# Patient Record
Sex: MALE | Race: WHITE | Employment: UNEMPLOYED | URBAN - METROPOLITAN AREA
[De-identification: names, ages, dates, MRNs, and addresses within clinical notes are randomized per-mention and may not be internally consistent; named-entity substitution may affect disease eponyms.]

---

## 2018-11-25 ENCOUNTER — HOSPITAL ENCOUNTER (EMERGENCY)
Age: 28
Discharge: ANOTHER HOSPITAL | End: 2018-11-25
Attending: EMERGENCY MEDICINE
Payer: MEDICAID

## 2018-11-25 ENCOUNTER — APPOINTMENT (OUTPATIENT)
Dept: CT IMAGING | Age: 28
End: 2018-11-25
Payer: MEDICAID

## 2018-11-25 ENCOUNTER — HOSPITAL ENCOUNTER (INPATIENT)
Age: 28
LOS: 1 days | Discharge: HOME | DRG: 918 | End: 2018-11-26
Attending: INTERNAL MEDICINE | Admitting: INTERNAL MEDICINE
Payer: MEDICAID

## 2018-11-25 VITALS
DIASTOLIC BLOOD PRESSURE: 73 MMHG | TEMPERATURE: 98.6 F | SYSTOLIC BLOOD PRESSURE: 118 MMHG | OXYGEN SATURATION: 100 % | WEIGHT: 185 LBS | RESPIRATION RATE: 23 BRPM | HEART RATE: 42 BPM

## 2018-11-25 DIAGNOSIS — R56.9 SEIZURE (HCC): Primary | ICD-10-CM

## 2018-11-25 DIAGNOSIS — R00.1 BRADYCARDIA WITH 41-50 BEATS PER MINUTE: ICD-10-CM

## 2018-11-25 DIAGNOSIS — R00.1 SYMPTOMATIC BRADYCARDIA: ICD-10-CM

## 2018-11-25 DIAGNOSIS — R55 SYNCOPE AND COLLAPSE: Primary | ICD-10-CM

## 2018-11-25 LAB
A/G RATIO: 1.8 (ref 1.1–2.2)
ALBUMIN SERPL-MCNC: 4.9 G/DL (ref 3.4–5)
ALP BLD-CCNC: 62 U/L (ref 40–129)
ALT SERPL-CCNC: 10 U/L (ref 10–40)
AMPHETAMINE SCREEN, URINE: ABNORMAL
ANION GAP SERPL CALCULATED.3IONS-SCNC: 13 MMOL/L (ref 3–16)
AST SERPL-CCNC: 18 U/L (ref 15–37)
BACTERIA: ABNORMAL /HPF
BARBITURATE SCREEN URINE: ABNORMAL
BASOPHILS ABSOLUTE: 0.1 K/UL (ref 0–0.2)
BASOPHILS RELATIVE PERCENT: 1 %
BENZODIAZEPINE SCREEN, URINE: ABNORMAL
BILIRUB SERPL-MCNC: 0.6 MG/DL (ref 0–1)
BILIRUBIN URINE: NEGATIVE
BLOOD, URINE: ABNORMAL
BUN BLDV-MCNC: 12 MG/DL (ref 7–20)
CALCIUM SERPL-MCNC: 9.8 MG/DL (ref 8.3–10.6)
CANNABINOID SCREEN URINE: POSITIVE
CHLORIDE BLD-SCNC: 102 MMOL/L (ref 99–110)
CLARITY: CLEAR
CO2: 23 MMOL/L (ref 21–32)
COCAINE METABOLITE SCREEN URINE: ABNORMAL
COLOR: YELLOW
CREAT SERPL-MCNC: 0.9 MG/DL (ref 0.9–1.3)
EKG ATRIAL RATE: 41 BPM
EKG DIAGNOSIS: NORMAL
EKG P AXIS: 33 DEGREES
EKG P-R INTERVAL: 182 MS
EKG Q-T INTERVAL: 482 MS
EKG QRS DURATION: 110 MS
EKG QTC CALCULATION (BAZETT): 397 MS
EKG R AXIS: 75 DEGREES
EKG T AXIS: 65 DEGREES
EKG VENTRICULAR RATE: 41 BPM
EOSINOPHILS ABSOLUTE: 0.1 K/UL (ref 0–0.6)
EOSINOPHILS RELATIVE PERCENT: 1.2 %
EPITHELIAL CELLS, UA: ABNORMAL /HPF
GFR AFRICAN AMERICAN: >60
GFR NON-AFRICAN AMERICAN: >60
GLOBULIN: 2.7 G/DL
GLUCOSE BLD-MCNC: 89 MG/DL (ref 70–99)
GLUCOSE URINE: NEGATIVE MG/DL
HCT VFR BLD CALC: 46.4 % (ref 40.5–52.5)
HEMOGLOBIN: 16.4 G/DL (ref 13.5–17.5)
KETONES, URINE: NEGATIVE MG/DL
LEUKOCYTE ESTERASE, URINE: NEGATIVE
LYMPHOCYTES ABSOLUTE: 1.6 K/UL (ref 1–5.1)
LYMPHOCYTES RELATIVE PERCENT: 19.1 %
Lab: ABNORMAL
MCH RBC QN AUTO: 31.2 PG (ref 26–34)
MCHC RBC AUTO-ENTMCNC: 35.3 G/DL (ref 31–36)
MCV RBC AUTO: 88.4 FL (ref 80–100)
METHADONE SCREEN, URINE: ABNORMAL
MICROSCOPIC EXAMINATION: YES
MONOCYTES ABSOLUTE: 0.7 K/UL (ref 0–1.3)
MONOCYTES RELATIVE PERCENT: 7.9 %
MUCUS: ABNORMAL /LPF
NEUTROPHILS ABSOLUTE: 6 K/UL (ref 1.7–7.7)
NEUTROPHILS RELATIVE PERCENT: 70.8 %
NITRITE, URINE: NEGATIVE
OPIATE SCREEN URINE: ABNORMAL
OXYCODONE URINE: ABNORMAL
PDW BLD-RTO: 13.3 % (ref 12.4–15.4)
PH UA: 6.5
PH UA: 6.5
PHENCYCLIDINE SCREEN URINE: ABNORMAL
PLATELET # BLD: 308 K/UL (ref 135–450)
PMV BLD AUTO: 8.3 FL (ref 5–10.5)
POTASSIUM REFLEX MAGNESIUM: 3.8 MMOL/L (ref 3.5–5.1)
PROPOXYPHENE SCREEN: ABNORMAL
PROTEIN UA: NEGATIVE MG/DL
RBC # BLD: 5.25 M/UL (ref 4.2–5.9)
RBC UA: ABNORMAL /HPF (ref 0–2)
SODIUM BLD-SCNC: 138 MMOL/L (ref 136–145)
SPECIFIC GRAVITY UA: 1.02
TOTAL PROTEIN: 7.6 G/DL (ref 6.4–8.2)
TROPONIN: <0.01 NG/ML
URINE TYPE: ABNORMAL
UROBILINOGEN, URINE: 0.2 E.U./DL
WBC # BLD: 8.5 K/UL (ref 4–11)
WBC UA: ABNORMAL /HPF (ref 0–5)

## 2018-11-25 PROCEDURE — 99285 EMERGENCY DEPT VISIT HI MDM: CPT

## 2018-11-25 PROCEDURE — 82746 ASSAY OF FOLIC ACID SERUM: CPT

## 2018-11-25 PROCEDURE — 93010 ELECTROCARDIOGRAM REPORT: CPT | Performed by: INTERNAL MEDICINE

## 2018-11-25 PROCEDURE — 2580000003 HC RX 258: Performed by: EMERGENCY MEDICINE

## 2018-11-25 PROCEDURE — G0379 DIRECT REFER HOSPITAL OBSERV: HCPCS

## 2018-11-25 PROCEDURE — 2580000003 HC RX 258: Performed by: INTERNAL MEDICINE

## 2018-11-25 PROCEDURE — 84484 ASSAY OF TROPONIN QUANT: CPT

## 2018-11-25 PROCEDURE — G0378 HOSPITAL OBSERVATION PER HR: HCPCS

## 2018-11-25 PROCEDURE — 36415 COLL VENOUS BLD VENIPUNCTURE: CPT

## 2018-11-25 PROCEDURE — 96372 THER/PROPH/DIAG INJ SC/IM: CPT

## 2018-11-25 PROCEDURE — 93005 ELECTROCARDIOGRAM TRACING: CPT | Performed by: EMERGENCY MEDICINE

## 2018-11-25 PROCEDURE — 80307 DRUG TEST PRSMV CHEM ANLYZR: CPT

## 2018-11-25 PROCEDURE — 85025 COMPLETE CBC W/AUTO DIFF WBC: CPT

## 2018-11-25 PROCEDURE — 81001 URINALYSIS AUTO W/SCOPE: CPT

## 2018-11-25 PROCEDURE — 87086 URINE CULTURE/COLONY COUNT: CPT

## 2018-11-25 PROCEDURE — 82607 VITAMIN B-12: CPT

## 2018-11-25 PROCEDURE — 2060000000 HC ICU INTERMEDIATE R&B

## 2018-11-25 PROCEDURE — 70450 CT HEAD/BRAIN W/O DYE: CPT

## 2018-11-25 PROCEDURE — 80053 COMPREHEN METABOLIC PANEL: CPT

## 2018-11-25 PROCEDURE — 84443 ASSAY THYROID STIM HORMONE: CPT

## 2018-11-25 PROCEDURE — 96360 HYDRATION IV INFUSION INIT: CPT

## 2018-11-25 PROCEDURE — 6360000002 HC RX W HCPCS: Performed by: INTERNAL MEDICINE

## 2018-11-25 RX ORDER — ONDANSETRON 2 MG/ML
4 INJECTION INTRAMUSCULAR; INTRAVENOUS EVERY 6 HOURS PRN
Status: DISCONTINUED | OUTPATIENT
Start: 2018-11-25 | End: 2018-11-26 | Stop reason: HOSPADM

## 2018-11-25 RX ORDER — 0.9 % SODIUM CHLORIDE 0.9 %
1000 INTRAVENOUS SOLUTION INTRAVENOUS ONCE
Status: COMPLETED | OUTPATIENT
Start: 2018-11-25 | End: 2018-11-25

## 2018-11-25 RX ORDER — SODIUM CHLORIDE 9 MG/ML
INJECTION, SOLUTION INTRAVENOUS CONTINUOUS
Status: DISCONTINUED | OUTPATIENT
Start: 2018-11-25 | End: 2018-11-26 | Stop reason: HOSPADM

## 2018-11-25 RX ORDER — SODIUM CHLORIDE 0.9 % (FLUSH) 0.9 %
10 SYRINGE (ML) INJECTION EVERY 12 HOURS SCHEDULED
Status: DISCONTINUED | OUTPATIENT
Start: 2018-11-25 | End: 2018-11-26 | Stop reason: HOSPADM

## 2018-11-25 RX ORDER — SODIUM CHLORIDE 0.9 % (FLUSH) 0.9 %
10 SYRINGE (ML) INJECTION PRN
Status: DISCONTINUED | OUTPATIENT
Start: 2018-11-25 | End: 2018-11-26 | Stop reason: HOSPADM

## 2018-11-25 RX ADMIN — SODIUM CHLORIDE: 9 INJECTION, SOLUTION INTRAVENOUS at 16:12

## 2018-11-25 RX ADMIN — SODIUM CHLORIDE 1000 ML: 9 INJECTION, SOLUTION INTRAVENOUS at 10:26

## 2018-11-25 RX ADMIN — ENOXAPARIN SODIUM 40 MG: 40 INJECTION SUBCUTANEOUS at 16:12

## 2018-11-25 ASSESSMENT — PAIN SCALES - GENERAL
PAINLEVEL_OUTOF10: 0
PAINLEVEL_OUTOF10: 0

## 2018-11-25 NOTE — ED PROVIDER NOTES
Emergency Physician Note    Chief Complaint  Seizures (has been having seizures since the beginning of the year. has not seen a doctor. here today because he had one last night and the night before and has never had then back to back before.)       History of Present Illness  Vinod Garcia is a 33 y.o. male who presents to the ED for seizures and syncope. Patient is at the beginning of the sister having episodes where she he will feel very lightheaded, break out in sweats and then either faint or have a seizure. The first episode happened at the beginning of the year it was witnessed by his wife, she states that his right arm was tremorous his eyes rolled back and he was not responding to her, she states it lasted approximately 15 seconds. Initially the patient was having 1-2 episodes per week and then for a couple of months it seemed to have completely resolved and then it returned again and now it appears to be more frequent. He believes he's been having them at night because he wakes up really pale with diffuse myalgias. He's had 2 over the last 2 nights and therefore he decided to come to the ER for further evaluation. Patient states that he will frequently have episodes in which he will become lightheaded, pale, and very dizzy and diaphoretic but doesn't always lead to syncope/seizures. Denies any recent head trauma. Denies fever, chills, malaise, chest pain, shortness of breath, cough, abdominal pain, nausea, vomiting, diarrhea, headache, sore throat, dysuria, hematuria, back pain, rash. No palliative/provocative factors. Positives and pertinent negatives as per HPI. All other of the 10 systems were reviewed and are negative. I have reviewed the following from the nursing documentation:      Prior to Admission medications    Not on File       Allergies as of 11/25/2018    (Not on File)       No past medical history on file. Surgical History: No past surgical history on file. transcription errors may be present despite my best efforts to edit errors.     MD Yu Aguillon MD  11/25/18 1398

## 2018-11-25 NOTE — ED NOTES
1325 - room number 446-1  Report # [id number]   Had Russell Chacon talk to [facility] about transport needs. Quang Peterson  11/25/18 1321 [id number] - [facility] called with transport eta 1600 for first care.       Quang Peterson  11/25/18 0713

## 2018-11-25 NOTE — ED NOTES
Chief Complaint   Patient presents with    Seizures     has been having seizures since the beginning of the year. has not seen a doctor. here today because he had one last night and the night before and has never had then back to back before. Pt placed on cardiac monitor, patient appears to be sinus puneet to NSR. Call light within reach. Will continue to monitor.       Molly Esteban RN  11/25/18 9669

## 2018-11-26 ENCOUNTER — APPOINTMENT (OUTPATIENT)
Dept: MRI IMAGING | Age: 28
DRG: 918 | End: 2018-11-26
Attending: INTERNAL MEDICINE
Payer: MEDICAID

## 2018-11-26 VITALS
RESPIRATION RATE: 16 BRPM | HEIGHT: 73 IN | OXYGEN SATURATION: 98 % | BODY MASS INDEX: 23.92 KG/M2 | WEIGHT: 180.5 LBS | SYSTOLIC BLOOD PRESSURE: 109 MMHG | DIASTOLIC BLOOD PRESSURE: 72 MMHG | TEMPERATURE: 97.7 F | HEART RATE: 45 BPM

## 2018-11-26 DIAGNOSIS — R55 SYNCOPE AND COLLAPSE: Primary | ICD-10-CM

## 2018-11-26 LAB
FOLATE: 17.62 NG/ML (ref 4.78–24.2)
LV EF: 58 %
LVEF MODALITY: NORMAL
TSH REFLEX: 1.31 UIU/ML (ref 0.27–4.2)
URINE CULTURE, ROUTINE: NORMAL
VITAMIN B-12: 650 PG/ML (ref 211–911)

## 2018-11-26 PROCEDURE — 96372 THER/PROPH/DIAG INJ SC/IM: CPT

## 2018-11-26 PROCEDURE — 95819 EEG AWAKE AND ASLEEP: CPT

## 2018-11-26 PROCEDURE — 70553 MRI BRAIN STEM W/O & W/DYE: CPT

## 2018-11-26 PROCEDURE — 99254 IP/OBS CNSLTJ NEW/EST MOD 60: CPT | Performed by: PSYCHIATRY & NEUROLOGY

## 2018-11-26 PROCEDURE — G0378 HOSPITAL OBSERVATION PER HR: HCPCS

## 2018-11-26 PROCEDURE — 2580000003 HC RX 258: Performed by: INTERNAL MEDICINE

## 2018-11-26 PROCEDURE — 6360000004 HC RX CONTRAST MEDICATION: Performed by: PSYCHIATRY & NEUROLOGY

## 2018-11-26 PROCEDURE — 95816 EEG AWAKE AND DROWSY: CPT | Performed by: PSYCHIATRY & NEUROLOGY

## 2018-11-26 PROCEDURE — A9579 GAD-BASE MR CONTRAST NOS,1ML: HCPCS | Performed by: PSYCHIATRY & NEUROLOGY

## 2018-11-26 PROCEDURE — 93306 TTE W/DOPPLER COMPLETE: CPT | Performed by: INTERNAL MEDICINE

## 2018-11-26 PROCEDURE — 6360000002 HC RX W HCPCS: Performed by: INTERNAL MEDICINE

## 2018-11-26 RX ORDER — MORPHINE SULFATE 2 MG/ML
2 INJECTION, SOLUTION INTRAMUSCULAR; INTRAVENOUS ONCE
Status: DISCONTINUED | OUTPATIENT
Start: 2018-11-26 | End: 2018-11-26

## 2018-11-26 RX ADMIN — Medication 10 ML: at 09:01

## 2018-11-26 RX ADMIN — ENOXAPARIN SODIUM 40 MG: 40 INJECTION SUBCUTANEOUS at 09:05

## 2018-11-26 RX ADMIN — GADOTERIDOL 15 ML: 279.3 INJECTION, SOLUTION INTRAVENOUS at 16:30

## 2018-11-26 RX ADMIN — SODIUM CHLORIDE: 9 INJECTION, SOLUTION INTRAVENOUS at 01:20

## 2018-11-26 ASSESSMENT — PAIN SCALES - GENERAL: PAINLEVEL_OUTOF10: 0

## 2018-11-26 NOTE — CONSULTS
attempts are made to edit the dictation for accuracy, there may be errors in the  transcription that are not intended

## 2018-11-26 NOTE — H&P
Hospital Medicine History & Physical      PCP: No primary care provider on file. Date of Admission: 11/25/2018    Date of Service: Pt seen/examined on 11/25/18 and  Placed in Observation. Chief Complaint:  Possible seizure      History Of Present Illness:  28 y.o. male who presented to [facility] with a direct transfer from [facility].  Patient states that he awoke this morning patient has awoken lightheaded and pale and dizzy and diaphoretic. He felt or seizures coming on but did not actually have any witnessed seizures. Patient has had previous episode several months ago where he did have jerking motions which were described as seizures but he did not seek any medical attention at that time. he was noted to be very bradycardic in the emergency room and was transferred here for cardiac and neurological workup  He should states he's had been on otherwise good health. He denies illicit drug use other than smoking marijuana which he states he hasn't done in several weeks     Past Medical History:      No past medical history on file. Past Surgical History:      No past surgical history on file. Medications Prior to Admission:      Prior to Admission medications    Not on File       Allergies:  Patient has no known allergies. Social History:      The patient currently lives Family    TOBACCO:   reports that he has been smoking. He has a 7.50 pack-year smoking history. He has never used smokeless tobacco.  ETOH:   reports that he drinks alcohol. Family History:       Reviewed in detail and negative for DM, CAD, Cancer, CVA. Positive as follows:    No family history on file. REVIEW OF SYSTEMS:   Pertinent positives as noted in the HPI. All other systems reviewed and negative.     PHYSICAL EXAM PERFORMED:    /69   Pulse 52   Temp 98.3 °F (36.8 °C) (Oral)   Resp 14   Ht 6' 1\" (1.854 m)   Wt 177 lb 4.8 oz (80.4 kg)   SpO2 97%   BMI 23.39 kg/m²     General

## 2018-11-26 NOTE — PLAN OF CARE
Problem: Health Behavior:  Goal: Ability to manage health-related needs will improve  Ability to manage health-related needs will improve   Outcome: Ongoing  Will continue to monitor.

## 2018-11-26 NOTE — CARE COORDINATION
11/26/18 Chart reviewed, asked financial counseling to see this pt.  Please use OP pharmacy on d/c and notify CM should pt have any additional needs

## 2018-11-30 NOTE — DISCHARGE SUMMARY
cardiology  MRI was negative  Check TSH folate and B12 were wnl    Physical Exam Performed:     /72   Pulse (!) 45   Temp 97.7 °F (36.5 °C) (Oral)   Resp 16   Ht 6' 1\" (1.854 m)   Wt 180 lb 8 oz (81.9 kg)   SpO2 98%   BMI 23.81 kg/m²       General appearance:  No apparent distress, appears stated age and cooperative. HEENT:  Normal cephalic, atraumatic without obvious deformity. Pupils equal, round, and reactive to light. Extra ocular muscles intact. Conjunctivae/corneas clear. Neck: Supple, with full range of motion. No jugular venous distention. Trachea midline. Respiratory:  Normal respiratory effort. Clear to auscultation, bilaterally without Rales/Wheezes/Rhonchi. Cardiovascular:  Regular rate and rhythm with normal S1/S2 without murmurs, rubs or gallops. Abdomen: Soft, non-tender, non-distended with normal bowel sounds. Musculoskeletal:  No clubbing, cyanosis or edema bilaterally. Full range of motion without deformity. Skin: Skin color, texture, turgor normal.  No rashes or lesions. Neurologic:  Neurovascularly intact without any focal sensory/motor deficits. Cranial nerves: II-XII intact, grossly non-focal.  Psychiatric:  Alert and oriented, thought content appropriate, normal insight  Capillary Refill: Brisk,< 3 seconds   Peripheral Pulses: +2 palpable, equal bilaterally       Labs: For convenience and continuity at follow-up the following most recent labs are provided:      CBC:    Lab Results   Component Value Date    WBC 8.5 11/25/2018    HGB 16.4 11/25/2018    HCT 46.4 11/25/2018     11/25/2018       Renal:    Lab Results   Component Value Date     11/25/2018    K 3.8 11/25/2018     11/25/2018    CO2 23 11/25/2018    BUN 12 11/25/2018    CREATININE 0.9 11/25/2018    CALCIUM 9.8 11/25/2018         Significant Diagnostic Studies    Radiology:   MRI BRAIN W WO CONTRAST   Final Result   No acute intracranial abnormality.   No mass effect or abnormal intracranial

## 2019-01-15 ENCOUNTER — TELEPHONE (OUTPATIENT)
Dept: CARDIOLOGY CLINIC | Age: 29
End: 2019-01-15

## 2019-01-15 DIAGNOSIS — R55 SYNCOPE AND COLLAPSE: ICD-10-CM

## 2019-01-15 PROCEDURE — 93228 REMOTE 30 DAY ECG REV/REPORT: CPT | Performed by: INTERNAL MEDICINE

## 2019-11-30 ENCOUNTER — HOSPITAL ENCOUNTER (EMERGENCY)
Age: 29
Discharge: HOME | End: 2019-11-30
Attending: EMERGENCY MEDICINE
Payer: MEDICAID

## 2019-11-30 ENCOUNTER — APPOINTMENT (OUTPATIENT)
Dept: GENERAL RADIOLOGY | Age: 29
End: 2019-11-30
Payer: MEDICAID

## 2019-11-30 VITALS
HEART RATE: 60 BPM | WEIGHT: 180 LBS | OXYGEN SATURATION: 98 % | TEMPERATURE: 98 F | HEIGHT: 73 IN | RESPIRATION RATE: 16 BRPM | SYSTOLIC BLOOD PRESSURE: 119 MMHG | DIASTOLIC BLOOD PRESSURE: 75 MMHG | BODY MASS INDEX: 23.86 KG/M2

## 2019-11-30 DIAGNOSIS — R55 SYNCOPE AND COLLAPSE: ICD-10-CM

## 2019-11-30 DIAGNOSIS — R07.9 CHEST PAIN, UNSPECIFIED TYPE: Primary | ICD-10-CM

## 2019-11-30 LAB
A/G RATIO: 1.8 (ref 1.1–2.2)
ALBUMIN SERPL-MCNC: 4.4 G/DL (ref 3.4–5)
ALP BLD-CCNC: 66 U/L (ref 40–129)
ALT SERPL-CCNC: 20 U/L (ref 10–40)
ANION GAP SERPL CALCULATED.3IONS-SCNC: 11 MMOL/L (ref 3–16)
AST SERPL-CCNC: 21 U/L (ref 15–37)
BASOPHILS ABSOLUTE: 0.1 K/UL (ref 0–0.2)
BASOPHILS RELATIVE PERCENT: 0.9 %
BILIRUB SERPL-MCNC: 0.3 MG/DL (ref 0–1)
BUN BLDV-MCNC: 12 MG/DL (ref 7–20)
CALCIUM SERPL-MCNC: 9.3 MG/DL (ref 8.3–10.6)
CHLORIDE BLD-SCNC: 104 MMOL/L (ref 99–110)
CO2: 23 MMOL/L (ref 21–32)
CREAT SERPL-MCNC: 0.7 MG/DL (ref 0.9–1.3)
D DIMER: <200 NG/ML DDU (ref 0–229)
EOSINOPHILS ABSOLUTE: 0.3 K/UL (ref 0–0.6)
EOSINOPHILS RELATIVE PERCENT: 3.3 %
GFR AFRICAN AMERICAN: >60
GFR NON-AFRICAN AMERICAN: >60
GLOBULIN: 2.5 G/DL
GLUCOSE BLD-MCNC: 106 MG/DL (ref 70–99)
HCT VFR BLD CALC: 47.6 % (ref 40.5–52.5)
HEMOGLOBIN: 16.5 G/DL (ref 13.5–17.5)
LYMPHOCYTES ABSOLUTE: 2 K/UL (ref 1–5.1)
LYMPHOCYTES RELATIVE PERCENT: 21.6 %
MCH RBC QN AUTO: 30.8 PG (ref 26–34)
MCHC RBC AUTO-ENTMCNC: 34.7 G/DL (ref 31–36)
MCV RBC AUTO: 88.7 FL (ref 80–100)
MONOCYTES ABSOLUTE: 0.7 K/UL (ref 0–1.3)
MONOCYTES RELATIVE PERCENT: 7.5 %
NEUTROPHILS ABSOLUTE: 6.2 K/UL (ref 1.7–7.7)
NEUTROPHILS RELATIVE PERCENT: 66.7 %
PDW BLD-RTO: 13.3 % (ref 12.4–15.4)
PLATELET # BLD: 333 K/UL (ref 135–450)
PMV BLD AUTO: 8.2 FL (ref 5–10.5)
POTASSIUM SERPL-SCNC: 3.9 MMOL/L (ref 3.5–5.1)
RBC # BLD: 5.36 M/UL (ref 4.2–5.9)
SODIUM BLD-SCNC: 138 MMOL/L (ref 136–145)
TOTAL PROTEIN: 6.9 G/DL (ref 6.4–8.2)
TROPONIN: <0.01 NG/ML
WBC # BLD: 9.3 K/UL (ref 4–11)

## 2019-11-30 PROCEDURE — 84484 ASSAY OF TROPONIN QUANT: CPT

## 2019-11-30 PROCEDURE — 85025 COMPLETE CBC W/AUTO DIFF WBC: CPT

## 2019-11-30 PROCEDURE — 99285 EMERGENCY DEPT VISIT HI MDM: CPT

## 2019-11-30 PROCEDURE — 93005 ELECTROCARDIOGRAM TRACING: CPT | Performed by: EMERGENCY MEDICINE

## 2019-11-30 PROCEDURE — 80053 COMPREHEN METABOLIC PANEL: CPT

## 2019-11-30 PROCEDURE — 85379 FIBRIN DEGRADATION QUANT: CPT

## 2019-11-30 PROCEDURE — 71046 X-RAY EXAM CHEST 2 VIEWS: CPT

## 2019-11-30 ASSESSMENT — ENCOUNTER SYMPTOMS
NAUSEA: 0
FACIAL SWELLING: 0
WHEEZING: 0
BLOOD IN STOOL: 0
PHOTOPHOBIA: 0
VOICE CHANGE: 0
SHORTNESS OF BREATH: 1
TROUBLE SWALLOWING: 0
ABDOMINAL PAIN: 0
COUGH: 0
VOMITING: 0
STRIDOR: 0
BACK PAIN: 0
COLOR CHANGE: 0

## 2019-11-30 ASSESSMENT — PAIN DESCRIPTION - DESCRIPTORS: DESCRIPTORS: ACHING

## 2019-11-30 ASSESSMENT — PAIN DESCRIPTION - LOCATION: LOCATION: CHEST

## 2019-11-30 ASSESSMENT — PAIN DESCRIPTION - PAIN TYPE: TYPE: ACUTE PAIN

## 2019-11-30 ASSESSMENT — PAIN DESCRIPTION - ONSET: ONSET: ON-GOING

## 2019-11-30 ASSESSMENT — PAIN SCALES - GENERAL: PAINLEVEL_OUTOF10: 4

## 2019-11-30 ASSESSMENT — PAIN DESCRIPTION - FREQUENCY: FREQUENCY: CONTINUOUS

## 2019-12-01 LAB
EKG ATRIAL RATE: 61 BPM
EKG DIAGNOSIS: NORMAL
EKG P AXIS: 21 DEGREES
EKG P-R INTERVAL: 168 MS
EKG Q-T INTERVAL: 398 MS
EKG QRS DURATION: 114 MS
EKG QTC CALCULATION (BAZETT): 400 MS
EKG R AXIS: 75 DEGREES
EKG T AXIS: 56 DEGREES
EKG VENTRICULAR RATE: 61 BPM

## 2019-12-01 PROCEDURE — 93010 ELECTROCARDIOGRAM REPORT: CPT | Performed by: INTERNAL MEDICINE
